# Patient Record
Sex: FEMALE | Race: WHITE
[De-identification: names, ages, dates, MRNs, and addresses within clinical notes are randomized per-mention and may not be internally consistent; named-entity substitution may affect disease eponyms.]

---

## 2018-10-15 ENCOUNTER — HOSPITAL ENCOUNTER (INPATIENT)
Dept: HOSPITAL 89 - OR | Age: 58
LOS: 2 days | Discharge: HOME | DRG: 460 | End: 2018-10-17
Attending: ORTHOPAEDIC SURGERY | Admitting: ORTHOPAEDIC SURGERY
Payer: COMMERCIAL

## 2018-10-15 VITALS
HEIGHT: 66 IN | WEIGHT: 212 LBS | HEIGHT: 66 IN | BODY MASS INDEX: 34.07 KG/M2 | BODY MASS INDEX: 34.07 KG/M2 | WEIGHT: 212 LBS

## 2018-10-15 VITALS — DIASTOLIC BLOOD PRESSURE: 48 MMHG | SYSTOLIC BLOOD PRESSURE: 86 MMHG

## 2018-10-15 VITALS — SYSTOLIC BLOOD PRESSURE: 106 MMHG | DIASTOLIC BLOOD PRESSURE: 60 MMHG

## 2018-10-15 VITALS — SYSTOLIC BLOOD PRESSURE: 96 MMHG | DIASTOLIC BLOOD PRESSURE: 65 MMHG

## 2018-10-15 VITALS — DIASTOLIC BLOOD PRESSURE: 72 MMHG | SYSTOLIC BLOOD PRESSURE: 114 MMHG

## 2018-10-15 VITALS — SYSTOLIC BLOOD PRESSURE: 92 MMHG | DIASTOLIC BLOOD PRESSURE: 54 MMHG

## 2018-10-15 VITALS — SYSTOLIC BLOOD PRESSURE: 109 MMHG | DIASTOLIC BLOOD PRESSURE: 63 MMHG

## 2018-10-15 VITALS — DIASTOLIC BLOOD PRESSURE: 81 MMHG | SYSTOLIC BLOOD PRESSURE: 111 MMHG

## 2018-10-15 VITALS — SYSTOLIC BLOOD PRESSURE: 95 MMHG | DIASTOLIC BLOOD PRESSURE: 63 MMHG

## 2018-10-15 VITALS — SYSTOLIC BLOOD PRESSURE: 90 MMHG | DIASTOLIC BLOOD PRESSURE: 57 MMHG

## 2018-10-15 VITALS — DIASTOLIC BLOOD PRESSURE: 59 MMHG | SYSTOLIC BLOOD PRESSURE: 91 MMHG

## 2018-10-15 VITALS — DIASTOLIC BLOOD PRESSURE: 57 MMHG | SYSTOLIC BLOOD PRESSURE: 85 MMHG

## 2018-10-15 VITALS — SYSTOLIC BLOOD PRESSURE: 111 MMHG | DIASTOLIC BLOOD PRESSURE: 69 MMHG

## 2018-10-15 VITALS — SYSTOLIC BLOOD PRESSURE: 104 MMHG | DIASTOLIC BLOOD PRESSURE: 66 MMHG

## 2018-10-15 VITALS — SYSTOLIC BLOOD PRESSURE: 101 MMHG | DIASTOLIC BLOOD PRESSURE: 61 MMHG

## 2018-10-15 DIAGNOSIS — M54.16: ICD-10-CM

## 2018-10-15 DIAGNOSIS — F32.9: ICD-10-CM

## 2018-10-15 DIAGNOSIS — Z88.0: ICD-10-CM

## 2018-10-15 DIAGNOSIS — Z88.8: ICD-10-CM

## 2018-10-15 DIAGNOSIS — E66.9: ICD-10-CM

## 2018-10-15 DIAGNOSIS — J45.909: ICD-10-CM

## 2018-10-15 DIAGNOSIS — Z98.84: ICD-10-CM

## 2018-10-15 DIAGNOSIS — M48.061: Primary | ICD-10-CM

## 2018-10-15 DIAGNOSIS — Z90.710: ICD-10-CM

## 2018-10-15 DIAGNOSIS — Z90.49: ICD-10-CM

## 2018-10-15 DIAGNOSIS — E03.9: ICD-10-CM

## 2018-10-15 DIAGNOSIS — Z91.040: ICD-10-CM

## 2018-10-15 DIAGNOSIS — G43.909: ICD-10-CM

## 2018-10-15 DIAGNOSIS — Z88.5: ICD-10-CM

## 2018-10-15 PROCEDURE — 97162 PT EVAL MOD COMPLEX 30 MIN: CPT

## 2018-10-15 PROCEDURE — 86850 RBC ANTIBODY SCREEN: CPT

## 2018-10-15 PROCEDURE — 36415 COLL VENOUS BLD VENIPUNCTURE: CPT

## 2018-10-15 PROCEDURE — 86901 BLOOD TYPING SEROLOGIC RH(D): CPT

## 2018-10-15 PROCEDURE — 86900 BLOOD TYPING SEROLOGIC ABO: CPT

## 2018-10-15 PROCEDURE — 76000 FLUOROSCOPY <1 HR PHYS/QHP: CPT

## 2018-10-15 PROCEDURE — 72100 X-RAY EXAM L-S SPINE 2/3 VWS: CPT

## 2018-10-15 PROCEDURE — 0SG00AJ FUSION OF LUMBAR VERTEBRAL JOINT WITH INTERBODY FUSION DEVICE, POSTERIOR APPROACH, ANTERIOR COLUMN, OPEN APPROACH: ICD-10-PCS | Performed by: ORTHOPAEDIC SURGERY

## 2018-10-15 RX ADMIN — HYDROMORPHONE HYDROCHLORIDE PRN MG: 2 INJECTION, SOLUTION INTRAMUSCULAR; INTRAVENOUS; SUBCUTANEOUS at 19:50

## 2018-10-15 RX ADMIN — DIAZEPAM PRN MG: 5 TABLET ORAL at 15:59

## 2018-10-15 RX ADMIN — OXYCODONE HYDROCHLORIDE PRN MG: 5 TABLET ORAL at 21:28

## 2018-10-15 RX ADMIN — OXYCODONE HYDROCHLORIDE PRN MG: 5 TABLET ORAL at 18:11

## 2018-10-15 RX ADMIN — HYDROCODONE BITARTRATE AND ACETAMINOPHEN PRN EACH: 5; 325 TABLET ORAL at 17:22

## 2018-10-15 RX ADMIN — LINACLOTIDE SCH MCG: 290 CAPSULE, GELATIN COATED ORAL at 21:38

## 2018-10-15 RX ADMIN — LEVOTHYROXINE SODIUM SCH MG: 50 TABLET ORAL at 21:29

## 2018-10-15 RX ADMIN — NORTRIPTYLINE HYDROCHLORIDE SCH MG: 25 CAPSULE ORAL at 21:28

## 2018-10-15 RX ADMIN — HYDROCODONE BITARTRATE AND ACETAMINOPHEN PRN EACH: 5; 325 TABLET ORAL at 22:45

## 2018-10-15 RX ADMIN — DOCUSATE SODIUM SCH MG: 100 CAPSULE, LIQUID FILLED ORAL at 21:29

## 2018-10-15 RX ADMIN — CLINDAMYCIN PHOSPHATE SCH MLS/HR: 18 INJECTION, SOLUTION INTRAVENOUS at 17:21

## 2018-10-15 NOTE — HOSPITALIST CONSULTATION
History of Present Illness


Requesting Physician


Dr. Ramos


Reason for Consult


Medical Management


Chief Complaint


s/p lumbar fusion


History of Present Illness


She was admitted s/p lumbar fusion. It is reported the surgery went well and 


without complication.





History


Problems:  


(1) Asthma


Status:  Chronic


(2) Depression


Status:  Chronic


(3) Hypothyroidism


Status:  Chronic


(4) Migraine


Status:  Chronic


Home Meds


Reported Medications


Mth/Me Blue/Sod Phos/Phen/Hyos (URIBEL CAPSULE) 1 Each Capsule, 1 EACH PO PRN, 


CAPSULE


   10/10/18


Nitrofurantoin Monohyd/M-Cryst (MACROBID 100 MG CAPSULE) 100 Mg Capsule, 50 MG 


PO PRN for BLADDER INFECTION, CAPSULE


   10/10/18


Propranolol Hcl (INDERAL LA) 160 Mg Cap.sa.24h, 160 MG PO QHS


   10/10/18


Blue-Green Algae (SPIRULINA) 500 Mg Tablet, 500 MG PO QHS for RESTLESS LEGS 


   10/10/18


Gabapentin (GABAPENTIN) 300 Mg Capsule, 600 MG PO PRN for LEG PAIN, CAPSULE


   10/10/18


Ergocalciferol (Vitamin D2) (VITAMIN D2) 50,000 Unit Capsule, 58127 UNIT PO 


QWEEKF, CAPSULE


   10/10/18


Bupropion Hcl (BUPROPION XL) 150 Mg Tab.er.24h, 300 MG PO QHS for depression, 


TAB


   10/10/18


Levothyroxine Sodium (LEVOTHYROXINE SODIUM) 50 Mcg Tablet, 50 MCG PO QHS for 


hypothyroid, TAB


   10/10/18


Diazepam (DIAZEPAM) 5 Mg Tablet, 5 MG PO PRN PRN for ANXIETY, #5 TAB


   10/10/18


Acetaminophen With Codeine # 3 (TYLENOL WITH CODEINE #3 TABLET) 1 Each Tablet, 1


EACH PO PRN for PAIN, TAB


   10/10/18


Linaclotide (LINZESS) 290 Mcg Capsule, 290 MCG PO QHS for constipation , CAPSULE


   10/10/18


Zolpidem Tartrate (AMBIEN CR) 12.5 Mg Tab.mphase, 1 TAB PO QHS for Sleep, TAB


   10/10/18


Nortriptyline Hcl (NORTRIPTYLINE HCL) 50 Mg Capsule, 50 MG PO HS for MIGRAINS, 


CAPSULE


   10/10/18


Discontinued Reported Medications


Propranolol Hcl (PROPRANOLOL HCL) 80 Mg Capcr, 160 MG PO QDAY for MIGRAINS


   10/10/18


Allergies:  


Coded Allergies:  


     Penicillins (Unverified  Allergy, Intermediate, HIVES, 10/10/18)


     Cephalosporins (Unverified  Adverse Reaction, Intermediate, HIVES, N/V, 


10/10/18)


     latex (Unverified  Adverse Reaction, Intermediate, HIVES, 10/10/18)


   


   HIVES JUST WHERE THE LATEX TOUCHES


     oxycodone (Unverified  Adverse Reaction, Intermediate, N/V, ITCHING, 


BEHAVIORAL , 10/10/18)


     cephalexin (Unverified  Adverse Reaction, Mild, N/V, ITCHING, 10/10/18)


     ibuprofen (Unverified  Adverse Reaction, Mild, N/V, 10/10/18)


Hx Smoking:  No


Smoking Status:  Never Smoker


Exposure to Second Hand Smoke?:  No


Caffeine Intake:  Soda


Caffeine/Cups Per Day:  3/DAY


Hx Alcohol Use:  No


Hx Substance Use Disorder:  No


Social Drug Use:  Never





Review of Systems


All Systems Reviewed/Normal:  Yes, Except as Noted





Exam


Vital Signs





Vital Signs








  Date Time  Temp Pulse Resp B/P (MAP) Pulse Ox O2 Delivery O2 Flow Rate FiO2


 


10/15/18 14:35 97.6 61 16 104/66 (79) 96 Nasal Cannula 2.0 








General Appearance:  Alert, Awake, No Acute Distress, Afebrile


Neuro:  No Gross deficits


Cardiovascular:  Regular Rate and Rhythm


Respiratory:  No Respiratory Distress, Clear to Auscultation


GI:  Abd Soft and Non-Tender


Psych:  Alert & Oriented X3, Appropriate Mood & Affect





Assessment and Plan


Problems:  


(1) S/P lumbar fusion


Status:  Acute


Assessment & Plan:  Followed by Dr. Ramos. 





(2) Migraine


Status:  Chronic


Assessment & Plan:  She is on preventive treatment with Propranolol and 


Nortriptyline.  Continue. 





(3) Hypothyroidism


Status:  Chronic


Assessment & Plan:  She is on chronic treatment Levothyroxine. Continue. 





(4) Depression


Status:  Chronic


Assessment & Plan:  She is on chronic treatment Bupropion and Valium. Continue. 





(5) Asthma


Status:  Chronic


Assessment & Plan:  She does not require treatment, unless she encounters black 


mold. 








Venous Thromboembolism


Antithrombotics


Is Pt On Any Antithrombotics?:  No











DEANDRE HAYWOOD            Oct 15, 2018 15:18

## 2018-10-15 NOTE — RADIOLOGY IMAGING REPORT
FACILITY: [facility] [place] 

 

PATIENT NAME: Mia España

: 1960

MR: [id number]

V: [id number]

EXAM DATE: 

ORDERING PHYSICIAN: ARASELI RIZO

TECHNOLOGIST: 

 

Location: [facility]

Patient: Mia España

: 1960

MRN: [id number]

Visit/Account:[id number]

Date of Sevice: 10/15/2018

 

ACCESSION #: [id number]

 

Technique: C-ARM FLUORO 1 HR

 

HISTORY: MIS L4-5 TLIF

 

Comparison studies:  None

 

FINDINGS: 4 operative fluoroscopic images were obtained of the lumbar spine. Posterior spinal fusion 
hardware is noted at L4-L5 with an interbody spacer at this respective level.

 

Dose area product: 1.2 mGym2

 

IMPRESSION:

1.  Intraoperative fluoroscopic radiographs as described above. Please see operative report for furth
er details.

 

Report Dictated By: Sam Toure DO at 10/15/2018 1:17 PM

 

Report E-Signed By: Sam Toure DO  at 10/15/2018 1:20 PM

 

WSN:[id number]

## 2018-10-16 VITALS — DIASTOLIC BLOOD PRESSURE: 69 MMHG | SYSTOLIC BLOOD PRESSURE: 95 MMHG

## 2018-10-16 VITALS — DIASTOLIC BLOOD PRESSURE: 62 MMHG | SYSTOLIC BLOOD PRESSURE: 111 MMHG

## 2018-10-16 VITALS — DIASTOLIC BLOOD PRESSURE: 58 MMHG | SYSTOLIC BLOOD PRESSURE: 86 MMHG

## 2018-10-16 VITALS — SYSTOLIC BLOOD PRESSURE: 118 MMHG | DIASTOLIC BLOOD PRESSURE: 66 MMHG

## 2018-10-16 VITALS — SYSTOLIC BLOOD PRESSURE: 98 MMHG | DIASTOLIC BLOOD PRESSURE: 62 MMHG

## 2018-10-16 RX ADMIN — DIAZEPAM PRN MG: 5 TABLET ORAL at 06:28

## 2018-10-16 RX ADMIN — HYDROMORPHONE HYDROCHLORIDE PRN MG: 2 INJECTION, SOLUTION INTRAMUSCULAR; INTRAVENOUS; SUBCUTANEOUS at 12:48

## 2018-10-16 RX ADMIN — HYDROCODONE BITARTRATE AND ACETAMINOPHEN PRN EACH: 5; 325 TABLET ORAL at 04:09

## 2018-10-16 RX ADMIN — LEVOTHYROXINE SODIUM SCH MG: 50 TABLET ORAL at 21:23

## 2018-10-16 RX ADMIN — NORTRIPTYLINE HYDROCHLORIDE SCH MG: 25 CAPSULE ORAL at 21:23

## 2018-10-16 RX ADMIN — OXYCODONE HYDROCHLORIDE PRN MG: 5 TABLET ORAL at 01:25

## 2018-10-16 RX ADMIN — HYDROCODONE BITARTRATE AND ACETAMINOPHEN PRN EACH: 5; 325 TABLET ORAL at 16:02

## 2018-10-16 RX ADMIN — DOCUSATE SODIUM SCH MG: 100 CAPSULE, LIQUID FILLED ORAL at 21:23

## 2018-10-16 RX ADMIN — LINACLOTIDE SCH MCG: 290 CAPSULE, GELATIN COATED ORAL at 21:23

## 2018-10-16 RX ADMIN — DIAZEPAM PRN MG: 5 TABLET ORAL at 21:25

## 2018-10-16 RX ADMIN — HYDROMORPHONE HYDROCHLORIDE PRN MG: 2 INJECTION, SOLUTION INTRAMUSCULAR; INTRAVENOUS; SUBCUTANEOUS at 11:12

## 2018-10-16 RX ADMIN — CLINDAMYCIN PHOSPHATE SCH MLS/HR: 18 INJECTION, SOLUTION INTRAVENOUS at 10:31

## 2018-10-16 RX ADMIN — DOCUSATE SODIUM SCH MG: 100 CAPSULE, LIQUID FILLED ORAL at 08:44

## 2018-10-16 RX ADMIN — OXYCODONE HYDROCHLORIDE PRN MG: 5 TABLET ORAL at 05:22

## 2018-10-16 RX ADMIN — PROPRANOLOL HYDROCHLORIDE SCH MG: 80 CAPSULE, EXTENDED RELEASE ORAL at 08:44

## 2018-10-16 RX ADMIN — OXYCODONE HYDROCHLORIDE PRN MG: 5 TABLET ORAL at 18:37

## 2018-10-16 RX ADMIN — CLINDAMYCIN PHOSPHATE SCH MLS/HR: 18 INJECTION, SOLUTION INTRAVENOUS at 01:24

## 2018-10-16 RX ADMIN — OXYCODONE HYDROCHLORIDE PRN MG: 5 TABLET ORAL at 08:49

## 2018-10-16 NOTE — OPERATIVE REPORT 1
EVENT DATE: October 15, 2018

SURGEON: Segundo Ramos MD 

ANESTHESIOLOGIST: Armando Mares M.D. 

ANESTHESIA: General endotracheal

ASSISTANT: Sánchez Ratliff PA-C 





PREOPERATIVE DIAGNOSIS  

Right sided L4 radiculopathy with right sided L4-L5 foraminal stenosis.



POSTOPERATIVE DIAGNOSIS 

Right sided L4 radiculopathy with right sided L4-L5 foraminal stenosis.



PROCEDURE PERFORMED 

L4-L5 transforaminal lumber interbody fusion.



INTRAVENOUS FLUIDS 

1800 cc. 



ESTIMATED BLOOD LOSS 

50 cc 



IMPLANTS USED 

1.  6.5 mm x 45 pedicle screws from [employer] x4. 

2.  5.5 mm x 50 mm connecting rods from [employer] x2.

3.  9 mm size large interbody device from [employer].

4.  Four locking screws from [employer]. 



SPECIMENS

None.



DRAINS

None.



COMPLICATIONS

None.



DISPOSITION

Post-Anesthesia Care Unit.



INDICATIONS FOR SURGERY

Ms. España is a 58-year-old female who presented to our clinic with a complaint 

of severe radiating right lower extremity pain numbness and tingling in an L4 

distribution.  She had tried physical therapy, medications and activity 

modifications without any improvement.  Her x-rays showed degenerative changes 

at L4-5 and L5-S1 and the MRI showed severe foraminal stenosis on that right 

side compressing the L4 and the L5 nerve roots.  Injections had not helped 

either.  Physical examination was essentially normal.  Her examination was 

normal with no decrement in muscle strength but a little bit of subjective 

diminishment of light touch sensation on the right.  Secondary to ongoing 

symptoms and failure of nonsurgical care Ms. España was offered and elected to 

undergo an L4-L5 transforaminal lumbar interbody fusion. 



Prior to surgery, I explained in detail to the patient the possible risks of 

surgery.  These risks include bleeding, infection, damage to surrounding 

structures, nerve root injury, spinal fluid leak, meningitis, failure of 

instrumentation, persistent and/or worsening pain, need for further surgery, 

blindness,  sexual dysfunction, autonomic nervous system dysfunction and other 

unforeseen medical and surgical complications.  An understanding that in general

spinal surgery is more predictive at improving extremity discomfort than axial 

spine pain was stressed.



DESCRIPTION OF PROCEDURE 

On the date of surgery, the patient was met in the preoperative hold area and 

all questions were answered.  The operative site was identified and marked by 

myself.  She was taken in good condition to the operating room and after 

succumbing to anesthesia was positioned in the prone position on a Roel 

table.  All bony protuberances and soft tissues were well padded in the standard

fashion.  Care was taken to maintain appropriate perfusion pressures during 

anesthesia.  Preoperative antibiotics were administered according to the 

appropriate timing schedule.  At the conclusion of the procedure, sponge and 

needle counts were correct x2.  



Fluoroscopy was brought into the field and AP images were used to confirm the 

anatomic location to the lateral borders of the pedicle as well as the 

transverse portion of the pedicle in the horizontal plane.  These were marked on

the skin, ensuring that the endplates of the vertebral bodies in question were 

parallel to the beam when obtaining the images.  The patient was then prepped 

and draped in the sterile standard orthopedic fashion and a final time-out was 

undertaken to confirm correct patient, correct levels and correct surgery.  

Hardik style approaches were then performed bilaterally using a knife on the 

skin and then electrocautery through the fatty layer and then incising the 

interval between the longissimus and multifidus and using dissection between 

those two muscles to down onto the transverse processes and lateral aspect of 

the facet joints of L3-L4 and L4-L5.  Jamshidi needles were then used to 

cannulate the pedicles, placing the tip of the Jamshidi at the 3 o'clock 

position on the right side and the 9 o'clock position on the left side and, 

therefore, cannulating the L4 pedicles bilaterally and L5 pedicles bilaterally. 

Guidewires were then placed through the Jamshidi and lateral radiographs 

confirmed excellent positioning.  5 mm taps were then placed into the pedicles 

of L4 and L5 on the right side with appropriate length retractor blades 

attached.  The medially based retractor was then attached and distracted and the

medial blade was then similarly attached to the retractor, brought medial and 

then towed in.  This gave us excellent exposure of the L4-L5 facet joints as 

well as the L4 lamina.  An osteotome was used to osteotomize the lamina and the 

inferior articular process of L4 and then the osteotome was used to do the same 

of the superior articular process of L5.  This exposed the disk space and 

allowed us to ensure no further compression of the exiting or the transiting 

nerve root. An annulotomy knife was then used to incise the annulotomy of the 

L4-L5 disk.  A combination of different types of angles curettes as well as 

maria c was then used to perform a diskectomy at L4-L5.  A 9 mm trial was chosen

and placed in the interspace.  This was in perfect position on the AP and 

lateral views.  We, therefore, chose a size large 9 mm Lordotic curvilinear cage

and packed it with ViBone.  Local bone as well as some demineralized bone matrix

packed into the anterior aspect of the disk space prior to insertion of the 

titanium cage.  The titanium cage was inserted under fluoroscopic guidance and 

looked excellent on AP and lateral images.  The pitch-fork type device pitchfork

device was then used to rotate the interbody device into its appropriate 

position in the anterior third of the interbody space.  The guidewires were then

replaced through the taps on the right side and then was placed 6.5 mm x 45 mm 

screws in all four pedicles.  50 mm rods were selected and placed in a minimally

invasive fashion under fluoroscopic guidance.  Locking caps were then placed and

the superior caps were finally tightened.  The compression device was used to 

compress across the level and then the inferior locking caps were finally 

tightened with the torque limiting device as well.  The screw breakoff mechanism

was then employed, breaking off the towers from all four screws and final x-rays

were obtained.  These showed the interbody device to be anterior and within the 

interbody space on both AP and lateral views.  The rods were noted to be within 

the tulips and overall pedicle screw placement was excellent.  The wound was 

irrigated with copious sterile saline solution and then closed in layers using 

interrupted sutures for the deep fascia, inverted interrupted sutures for the 

subcutaneous tissue and running subcuticular skin stitch.  Sponge and needle 

counts were correct x2.



POSTOPERATIVE CARE PLAN 

Ms. España will remain in the hospital until she meets discharge criteria.  She 

will follow up with me in two weeks for wound check and examination. 

JERAMIE

## 2018-10-16 NOTE — HOSPITALIST PROGRESS NOTE
Subjective


Progress Notes


Subjective


She has complaints of pain to the surgical site today. She had no acute events 


overnight.





Patient Complains of:


Cardiovascular:  No: Chest Pain


Respiratory:  No: Shortness of Breath





Physical Exam





Vital Signs








  Date Time  Temp Pulse Resp B/P (MAP) Pulse Ox O2 Delivery O2 Flow Rate FiO2


 


10/16/18 08:49     86   


 


10/16/18 08:49      Nasal Cannula 0.5 


 


10/16/18 06:33 98.2  16 98/62 (74)    


 


10/16/18 02:35  66      














Intake and Output 


 


 10/16/18





 06:59


 


Intake Total 2500 ml


 


Balance 2500 ml


 


 


 


Intake Oral 100 ml


 


IV Total 2400 ml


 


# Voids 2








General Appearance:  Alert, Awake, No Acute Distress, Afebrile


Neuro:  No Gross deficits


Cardiovascular:  Regular Rate and Rhythm


Respiratory:  No Respiratory Distress, Clear to Auscultation


GI:  Soft and Non-Tender


Psych:  Alert & Oriented X3, Appropriate Mood & Affect





Assessment and Plan


Problems:  


(1) S/P lumbar fusion


Status:  Acute


Assessment & Plan:  Followed by Dr. Ramos. 





(2) Migraine


Status:  Chronic


Assessment & Plan:  She is on preventive treatment with Propranolol and 


Nortriptyline.  Continue. 





(3) Hypothyroidism


Status:  Chronic


Assessment & Plan:  She is on chronic treatment Levothyroxine. Continue. 





(4) Depression


Status:  Chronic


Assessment & Plan:  She is on chronic treatment Bupropion and Valium. Continue. 





(5) Asthma


Status:  Chronic


Assessment & Plan:  She does not require treatment, unless she encounters black 


mold. 








Exam


Sepsis Risk:  No Definite Risk











DEANDRE HAYWOOD FN            Oct 16, 2018 10:59

## 2018-10-17 VITALS — SYSTOLIC BLOOD PRESSURE: 93 MMHG | DIASTOLIC BLOOD PRESSURE: 63 MMHG

## 2018-10-17 VITALS — DIASTOLIC BLOOD PRESSURE: 81 MMHG | SYSTOLIC BLOOD PRESSURE: 122 MMHG

## 2018-10-17 RX ADMIN — DIAZEPAM PRN MG: 5 TABLET ORAL at 04:35

## 2018-10-17 RX ADMIN — DIAZEPAM PRN MG: 5 TABLET ORAL at 04:04

## 2018-10-17 RX ADMIN — PROPRANOLOL HYDROCHLORIDE SCH MG: 80 CAPSULE, EXTENDED RELEASE ORAL at 09:00

## 2018-10-17 RX ADMIN — OXYCODONE HYDROCHLORIDE PRN MG: 5 TABLET ORAL at 00:15

## 2018-10-17 RX ADMIN — HYDROCODONE BITARTRATE AND ACETAMINOPHEN PRN EACH: 5; 325 TABLET ORAL at 16:06

## 2018-10-17 RX ADMIN — OXYCODONE HYDROCHLORIDE PRN MG: 5 TABLET ORAL at 04:35

## 2018-10-17 RX ADMIN — DOCUSATE SODIUM SCH MG: 100 CAPSULE, LIQUID FILLED ORAL at 09:56

## 2018-10-17 RX ADMIN — HYDROCODONE BITARTRATE AND ACETAMINOPHEN PRN EACH: 5; 325 TABLET ORAL at 01:02

## 2018-10-17 RX ADMIN — HYDROCODONE BITARTRATE AND ACETAMINOPHEN PRN EACH: 5; 325 TABLET ORAL at 09:56

## 2018-10-17 RX ADMIN — OXYCODONE HYDROCHLORIDE PRN MG: 5 TABLET ORAL at 04:04

## 2018-10-17 NOTE — MISCELLANEOUS PROVIDER NOTE
Miscellaneous Provider Note


Note


Patient showed improvement today with physical therapy. She has been cleared by 


therapy and will go home. No concerns medically for patient to go home today.











DEANDRE HAYWOOD FNP            Oct 17, 2018 15:12

## 2018-10-17 NOTE — RADIOLOGY IMAGING REPORT
FACILITY: [facility] - [place], [place] 

 

PATIENT NAME: Mia España

: 1960

MR: [id number]

V: [id number]

EXAM DATE: 

ORDERING PHYSICIAN: RAASELI RIZO

TECHNOLOGIST: 

 

Location: [facility]

Patient: Mia España

: 1960

MRN: [id number]

Visit/Account:[id number]

Date of Sevice: 10/17/2018

 

ACCESSION #: [id number]

 

LUMBAR SPINE 2 OR 3 VIEW

 

HISTORY: Post procedure.

 

COMPARISON: None.

 

TECHNIQUE: AP and lateral views of the lumbar spine.

 

FINDINGS: There are 5 nonrib-bearing lumbar type vertebral bodies. There are changes of posterior fus
ion at L4-5 with a disc spacer. Hardware is intact.

 

There is mild loss of disc height at L4-5. There is mild to moderate loss of disc height at L5-S1, an
d there are anterior and posterior osteophytes at both of these levels. Vertebral body heights are ma
intained. No listhesis.

 

There are surgical clips in the right upper quadrant and left upper quadrant. There are fasteners fro
m prior hernia repair projecting at the right lower abdomen to upper pelvis. There are pelvic phlebol
iths

 

IMPRESSION:

1. Changes of posterior fusion at L4-5.

 

Report Dictated By: Haylee Romero at 10/17/2018 6:44 AM

 

Report E-Signed By: Haylee Romero  at 10/17/2018 6:46 AM

 

WSN:[id number]

## 2018-10-17 NOTE — HOSPITALIST PROGRESS NOTE
Subjective


Progress Notes


Subjective


She has complaints of surgical site pain today. The patient is adamant she must 


leave today. She has been progressing slow and had difficulty ambulating 


yesterday.





Patient Complains of:


Cardiovascular:  No: Chest Pain


Respiratory:  No: Shortness of Breath





Physical Exam





Vital Signs








  Date Time  Temp Pulse Resp B/P (MAP) Pulse Ox O2 Delivery O2 Flow Rate FiO2


 


10/17/18 09:51 97.6 88 16 122/81 (95) 94 Room Air  


 


10/17/18 04:05       1.0 














Intake and Output 


 


 10/17/18





 07:00


 


Intake Total 0 ml


 


Output Total 2 ml


 


Balance -2 ml


 


 


 


Intake Oral 0 ml


 


Output Urine Total 2 ml


 


# Voids 4








General Appearance:  Alert, Awake, No Acute Distress, Afebrile


Neuro:  No Gross deficits


Cardiovascular:  Regular Rate and Rhythm


Respiratory:  No Respiratory Distress, Clear to Auscultation


GI:  Soft and Non-Tender


Psych:  Alert & Oriented X3, Appropriate Mood & Affect





Assessment and Plan


Problems:  


(1) S/P lumbar fusion


Status:  Acute


Assessment & Plan:  Followed by Dr. Ramos. Patient was advised it is not in her


best interest to leave today, as she has been having difficulty getting to 


bathroom and ectental daily tasks to care for herself.  Recommend decrease in 


narcotic for patient, as she has been slow to progress. 





(2) Migraine


Status:  Chronic


Assessment & Plan:  She is on preventive treatment with Propranolol and 


Nortriptyline.  Continue. 





(3) Hypothyroidism


Status:  Chronic


Assessment & Plan:  She is on chronic treatment Levothyroxine. Continue. 





(4) Depression


Status:  Chronic


Assessment & Plan:  She is on chronic treatment Bupropion and Valium. Continue. 





(5) Asthma


Status:  Chronic


Assessment & Plan:  She does not require treatment, unless she encounters black 


mold. 








Exam


Sepsis Risk:  No Definite Risk











DEANDRE HAYWOOD FNP            Oct 17, 2018 17:12